# Patient Record
(demographics unavailable — no encounter records)

---

## 2025-03-10 NOTE — HISTORY OF PRESENT ILLNESS
[FreeTextEntry8] : 46 year old M with no significant PMH presents for cough and wheeze. Pt denies CP/SOB, fever/chills, n/v/d/c.

## 2025-03-10 NOTE — PHYSICAL EXAM
[No Acute Distress] : no acute distress [Well-Appearing] : well-appearing [No Accessory Muscle Use] : no accessory muscle use [Coordination Grossly Intact] : coordination grossly intact [No Focal Deficits] : no focal deficits [Normal Gait] : normal gait [Normal Affect] : the affect was normal [Normal Insight/Judgement] : insight and judgment were intact [de-identified] : Right sided wheeze

## 2025-03-10 NOTE — PLAN
[FreeTextEntry1] : Rx sent. Pt advised to sign up for [facility] portal to review labs and communicate any questions or concerns directly. Yearly physical and return as needed for illness, medication refills, and new or existing complaints.

## 2025-07-18 NOTE — HEALTH RISK ASSESSMENT
[Good] : ~his/her~  mood as  good [No] : No [0] : 2) Feeling down, depressed, or hopeless: Not at all (0) [PHQ-2 Negative - No further assessment needed] : PHQ-2 Negative - No further assessment needed [Never] : Never [None] : None [With Family] : lives with family [] :  [Audit-CScore] : 0 [[id number]] : 0 [ColonoscopyComments] : Due

## 2025-07-18 NOTE — PLAN
[FreeTextEntry1] : Routine blood work drawn in office and urine collected today. ECG today. Renew meds. Pt advised to sign up for [facility] portal to review labs and communicate any questions or concerns directly. Yearly physical and return as needed for illness, medication refills, and new or existing complaints.

## 2025-07-18 NOTE — HISTORY OF PRESENT ILLNESS
[de-identified] : 47 year old M with PMH HTN not currently on medication, Eczema, Vitamin D deficiency, and HLD presents for CPE. Pt denies CP/SOB, fever/chills, n/v/d/c.